# Patient Record
Sex: FEMALE | Race: WHITE | NOT HISPANIC OR LATINO | URBAN - METROPOLITAN AREA
[De-identification: names, ages, dates, MRNs, and addresses within clinical notes are randomized per-mention and may not be internally consistent; named-entity substitution may affect disease eponyms.]

---

## 2018-12-22 ENCOUNTER — EMERGENCY (EMERGENCY)
Facility: HOSPITAL | Age: 48
LOS: 1 days | Discharge: HOME | End: 2018-12-22
Attending: EMERGENCY MEDICINE | Admitting: EMERGENCY MEDICINE
Payer: COMMERCIAL

## 2018-12-22 VITALS
WEIGHT: 145.06 LBS | DIASTOLIC BLOOD PRESSURE: 83 MMHG | RESPIRATION RATE: 18 BRPM | TEMPERATURE: 98 F | HEIGHT: 64 IN | HEART RATE: 79 BPM | OXYGEN SATURATION: 100 % | SYSTOLIC BLOOD PRESSURE: 151 MMHG

## 2018-12-22 DIAGNOSIS — Z90.710 ACQUIRED ABSENCE OF BOTH CERVIX AND UTERUS: Chronic | ICD-10-CM

## 2018-12-22 LAB
ALBUMIN SERPL ELPH-MCNC: 3.7 G/DL — SIGNIFICANT CHANGE UP (ref 3.3–5)
ALP SERPL-CCNC: 82 U/L — SIGNIFICANT CHANGE UP (ref 40–120)
ALT FLD-CCNC: 35 U/L — SIGNIFICANT CHANGE UP (ref 12–78)
ANION GAP SERPL CALC-SCNC: 8 MMOL/L — SIGNIFICANT CHANGE UP (ref 5–17)
AST SERPL-CCNC: 40 U/L — HIGH (ref 15–37)
BILIRUB SERPL-MCNC: 0.2 MG/DL — SIGNIFICANT CHANGE UP (ref 0.2–1.2)
BUN SERPL-MCNC: 18 MG/DL — SIGNIFICANT CHANGE UP (ref 7–23)
CALCIUM SERPL-MCNC: 8.6 MG/DL — SIGNIFICANT CHANGE UP (ref 8.5–10.1)
CHLORIDE SERPL-SCNC: 104 MMOL/L — SIGNIFICANT CHANGE UP (ref 96–108)
CK SERPL-CCNC: 64 U/L — SIGNIFICANT CHANGE UP (ref 26–192)
CO2 SERPL-SCNC: 27 MMOL/L — SIGNIFICANT CHANGE UP (ref 22–31)
CREAT SERPL-MCNC: 0.81 MG/DL — SIGNIFICANT CHANGE UP (ref 0.5–1.3)
D DIMER BLD IA.RAPID-MCNC: 177 NG/ML DDU — SIGNIFICANT CHANGE UP
GLUCOSE SERPL-MCNC: 92 MG/DL — SIGNIFICANT CHANGE UP (ref 70–99)
HCG SERPL-ACNC: 2 MIU/ML — SIGNIFICANT CHANGE UP
HCT VFR BLD CALC: 38.9 % — SIGNIFICANT CHANGE UP (ref 34.5–45)
HGB BLD-MCNC: 13.5 G/DL — SIGNIFICANT CHANGE UP (ref 11.5–15.5)
MCHC RBC-ENTMCNC: 30.2 PG — SIGNIFICANT CHANGE UP (ref 27–34)
MCHC RBC-ENTMCNC: 34.7 GM/DL — SIGNIFICANT CHANGE UP (ref 32–36)
MCV RBC AUTO: 87 FL — SIGNIFICANT CHANGE UP (ref 80–100)
NRBC # BLD: 0 /100 WBCS — SIGNIFICANT CHANGE UP (ref 0–0)
PLATELET # BLD AUTO: 271 K/UL — SIGNIFICANT CHANGE UP (ref 150–400)
POTASSIUM SERPL-MCNC: 3.6 MMOL/L — SIGNIFICANT CHANGE UP (ref 3.5–5.3)
POTASSIUM SERPL-SCNC: 3.6 MMOL/L — SIGNIFICANT CHANGE UP (ref 3.5–5.3)
PROT SERPL-MCNC: 8.4 G/DL — HIGH (ref 6–8.3)
RBC # BLD: 4.47 M/UL — SIGNIFICANT CHANGE UP (ref 3.8–5.2)
RBC # FLD: 11.8 % — SIGNIFICANT CHANGE UP (ref 10.3–14.5)
SODIUM SERPL-SCNC: 139 MMOL/L — SIGNIFICANT CHANGE UP (ref 135–145)
TROPONIN I SERPL-MCNC: <.015 NG/ML — SIGNIFICANT CHANGE UP (ref 0.01–0.04)
WBC # BLD: 7.65 K/UL — SIGNIFICANT CHANGE UP (ref 3.8–10.5)
WBC # FLD AUTO: 7.65 K/UL — SIGNIFICANT CHANGE UP (ref 3.8–10.5)

## 2018-12-22 PROCEDURE — 99285 EMERGENCY DEPT VISIT HI MDM: CPT

## 2018-12-22 PROCEDURE — 71045 X-RAY EXAM CHEST 1 VIEW: CPT | Mod: 26

## 2018-12-22 RX ORDER — SODIUM CHLORIDE 9 MG/ML
1000 INJECTION INTRAMUSCULAR; INTRAVENOUS; SUBCUTANEOUS
Qty: 0 | Refills: 0 | Status: COMPLETED | OUTPATIENT
Start: 2018-12-22 | End: 2018-12-22

## 2018-12-22 RX ORDER — LISINOPRIL 2.5 MG/1
1 TABLET ORAL
Qty: 0 | Refills: 0 | COMMUNITY

## 2018-12-22 RX ADMIN — SODIUM CHLORIDE 1000 MILLILITER(S): 9 INJECTION INTRAMUSCULAR; INTRAVENOUS; SUBCUTANEOUS at 23:30

## 2018-12-22 RX ADMIN — SODIUM CHLORIDE 1000 MILLILITER(S): 9 INJECTION INTRAMUSCULAR; INTRAVENOUS; SUBCUTANEOUS at 22:30

## 2018-12-22 NOTE — ED PROVIDER NOTE - CARE PLAN
Principal Discharge DX:	Syncope and collapse Principal Discharge DX:	Syncope and collapse  Secondary Diagnosis:	UTI (urinary tract infection)

## 2018-12-22 NOTE — ED PROVIDER NOTE - OBJECTIVE STATEMENT
47 yo F p/w was at a bar tonight, and suddenly at rest developed chest pain - described as a tightness across lower chest. This was assoc with dizziness, light headed and diaphoresis. Pt then passed out, reported short loc. Pt believes she hit r side of head. Pt co mild malaise now. Occurred ~ 30 min pta. no further cp / dizziness. no recent events. Pt with nl po intake today. Pt was in nl state of health earlier except was diag with "mild UTI" this am. No cough/sputum. no fever/chills. no weak / malaise. no agg/allev factors. no other inj or co.

## 2018-12-22 NOTE — ED PROVIDER NOTE - CARE PROVIDER_API CALL
Bryson Boston), Internal Medicine  [address], [place] [id number]  Phone: [phone]  Fax: [phone]    Jan Shahid (), Internal Medicine  Internal Medicine  [address] [place] [address]  Phone: [phone]  Fax: [phone]

## 2018-12-22 NOTE — ED ADULT NURSE NOTE - NSIMPLEMENTINTERV_GEN_ALL_ED
Implemented All Fall with Harm Risk Interventions:  [place] to call system. Call bell, personal items and telephone within reach. Instruct patient to call for assistance. Room bathroom lighting operational. Non-slip footwear when patient is off stretcher. Physically safe environment: no spills, clutter or unnecessary equipment. Stretcher in lowest position, wheels locked, appropriate side rails in place. Provide visual cue, wrist band, yellow gown, etc. Monitor gait and stability. Monitor for mental status changes and reorient to person, place, and time. Review medications for side effects contributing to fall risk. Reinforce activity limits and safety measures with patient and family. Provide visual clues: red socks.

## 2018-12-22 NOTE — ED ADULT NURSE NOTE - OBJECTIVE STATEMENT
Pt. reported syncopal episode 1 hour prior to ED arrival. Pt. stated she was standing up and became diaphoretic with generalized abdominal discomfort. Pt. stated, " I woke up on the floor". Pts. spouse present at bedside during incident. Denied shortness of breath or chest pain.

## 2018-12-22 NOTE — ED PROVIDER NOTE - PROGRESS NOTE DETAILS
Pt doing welll, no acute co of changes. no events. Pt asymptomatic. Pt will await cardio in am. Pt doing well, no events. Pt awaiting cardio. anastacia (obey) seen eval pt, cleared for d/c and outpt f/u. Reevaluated patient at bedside.  Patient feeling well.  Discussed the results of all diagnostic testing in ED and copies of all reports given.   Patient relates aware of UTI, was dx yesterday, has RX for abx already. An opportunity to ask questions was given.  Discussed the importance of prompt, close medical follow-up.  Patient will return with any changes, concerns or persistent / worsening symptoms.  Understanding of all instructions verbalized.

## 2018-12-23 VITALS
TEMPERATURE: 98 F | OXYGEN SATURATION: 100 % | SYSTOLIC BLOOD PRESSURE: 149 MMHG | DIASTOLIC BLOOD PRESSURE: 64 MMHG | HEART RATE: 69 BPM | RESPIRATION RATE: 14 BRPM

## 2018-12-23 LAB
APPEARANCE UR: ABNORMAL
BACTERIA # UR AUTO: ABNORMAL
BILIRUB UR-MCNC: NEGATIVE — SIGNIFICANT CHANGE UP
CK SERPL-CCNC: 54 U/L — SIGNIFICANT CHANGE UP (ref 26–192)
COLOR SPEC: YELLOW — SIGNIFICANT CHANGE UP
DIFF PNL FLD: ABNORMAL
EPI CELLS # UR: ABNORMAL
GLUCOSE UR QL: NEGATIVE — SIGNIFICANT CHANGE UP
KETONES UR-MCNC: NEGATIVE — SIGNIFICANT CHANGE UP
LEUKOCYTE ESTERASE UR-ACNC: ABNORMAL
NITRITE UR-MCNC: NEGATIVE — SIGNIFICANT CHANGE UP
PH UR: 5 — SIGNIFICANT CHANGE UP (ref 5–8)
PROT UR-MCNC: NEGATIVE — SIGNIFICANT CHANGE UP
RBC CASTS # UR COMP ASSIST: SIGNIFICANT CHANGE UP /HPF (ref 0–4)
SP GR SPEC: 1.01 — SIGNIFICANT CHANGE UP (ref 1.01–1.02)
TROPONIN I SERPL-MCNC: <.015 NG/ML — SIGNIFICANT CHANGE UP (ref 0.01–0.04)
UROBILINOGEN FLD QL: NEGATIVE — SIGNIFICANT CHANGE UP
WBC UR QL: ABNORMAL

## 2018-12-23 PROCEDURE — 80053 COMPREHEN METABOLIC PANEL: CPT

## 2018-12-23 PROCEDURE — 99284 EMERGENCY DEPT VISIT MOD MDM: CPT | Mod: 25

## 2018-12-23 PROCEDURE — 87186 SC STD MICRODIL/AGAR DIL: CPT

## 2018-12-23 PROCEDURE — 36415 COLL VENOUS BLD VENIPUNCTURE: CPT

## 2018-12-23 PROCEDURE — 84484 ASSAY OF TROPONIN QUANT: CPT

## 2018-12-23 PROCEDURE — 96361 HYDRATE IV INFUSION ADD-ON: CPT

## 2018-12-23 PROCEDURE — 99284 EMERGENCY DEPT VISIT MOD MDM: CPT

## 2018-12-23 PROCEDURE — 70450 CT HEAD/BRAIN W/O DYE: CPT

## 2018-12-23 PROCEDURE — 84702 CHORIONIC GONADOTROPIN TEST: CPT

## 2018-12-23 PROCEDURE — 71045 X-RAY EXAM CHEST 1 VIEW: CPT

## 2018-12-23 PROCEDURE — 82550 ASSAY OF CK (CPK): CPT

## 2018-12-23 PROCEDURE — 96360 HYDRATION IV INFUSION INIT: CPT

## 2018-12-23 PROCEDURE — 87086 URINE CULTURE/COLONY COUNT: CPT

## 2018-12-23 PROCEDURE — 85379 FIBRIN DEGRADATION QUANT: CPT

## 2018-12-23 PROCEDURE — 85027 COMPLETE CBC AUTOMATED: CPT

## 2018-12-23 PROCEDURE — 81001 URINALYSIS AUTO W/SCOPE: CPT

## 2018-12-23 PROCEDURE — 93005 ELECTROCARDIOGRAM TRACING: CPT

## 2018-12-23 PROCEDURE — 70450 CT HEAD/BRAIN W/O DYE: CPT | Mod: 26

## 2018-12-23 RX ADMIN — SODIUM CHLORIDE 1000 MILLILITER(S): 9 INJECTION INTRAMUSCULAR; INTRAVENOUS; SUBCUTANEOUS at 00:10

## 2018-12-23 RX ADMIN — SODIUM CHLORIDE 1000 MILLILITER(S): 9 INJECTION INTRAMUSCULAR; INTRAVENOUS; SUBCUTANEOUS at 01:10

## 2018-12-23 NOTE — ED ADULT NURSE REASSESSMENT NOTE - NS ED NURSE REASSESS COMMENT FT1
report received and care assumed, pt resting in bed, NSR on monitor, awaiting cardiology at this time, no complaints, will continue to monitor

## 2018-12-23 NOTE — CONSULT NOTE ADULT - ASSESSMENT
Nargis appears well a clinical history most consistent with vasovagal syncope. She has no evidence for vascular disease or structural heart disease on examination. He had been some discussion concerning the etiology, natural history, and the management vasovagal syncope. She can be discharged home from a cardiac standpoint and conservative measures have been discussed to prevent this. She will undergo echocardiography and exercise stress testing as an outpatient to assess for any underlying structural heart disease she will call any change in symptoms and further management can be dependent on her clinical course

## 2018-12-23 NOTE — CONSULT NOTE ADULT - SUBJECTIVE AND OBJECTIVE BOX
[facility] Consultation    CHIEF COMPLAINT: Patient is a 48y old  Female who presents with a chief complaint of syncope    HPI:  Pt came to emergency room after an episode of syncope. She was in a bar last night and had one drink w/o any prior history. She had an episode of lightheadedness with diaphoresis and then had an episode of syncope possibly hitting the right side of her head. She came to the emergency room for evaluation and is now feeling better. She has no cardiac history and reports no chest pain, dyspnea, palpitations, lightheadedness, or prior syncope    PAST MEDICAL & SURGICAL HISTORY:  Hypertension  H/O abdominal hysterectomy      SOCIAL HISTORY: no tob    FAMILY HISTORY: no cardiac disease      MEDICATIONS  (STANDING): lisinopril 10 mg QD      Allergies    cephalosporins (Hives; Rash)  penicillins (Hives; Rash)    Intolerances        REVIEW OF SYSTEMS:    CONSTITUTIONAL: No weakness, fevers or chills  EYES: No visual changes, No diplopia  ENMT: No throat pain , No exudate  NECK: No pain or stiffness  RESPIRATORY: No cough, wheezing, hemoptysis; No shortness of breath  CARDIOVASCULAR: No chest pain or palpitations  GASTROINTESTINAL: No abdominal pain. No nausea, vomiting, or hematemesis; No diarrhea or constipation. No melena or hematochezia.  GENITOURINARY: No dysuria, frequency or hematuria  NEUROLOGICAL: No numbness or weakness  SKIN: No itching or rash  All other review of systems is negative unless indicated above    VITAL SIGNS:   Vital Signs Last 24 Hrs  T(C): 36.8 (23 Dec 2018 08:57), Max: 36.8 (23 Dec 2018 04:16)  T(F): 98.3 (23 Dec 2018 08:57), Max: 98.3 (23 Dec 2018 08:57)  HR: 69 (23 Dec 2018 08:57) (69 - 79)  BP: 149/64 (23 Dec 2018 08:57) (120/59 - 151/83)  BP(mean): --  RR: 14 (23 Dec 2018 08:57) (14 - 18)  SpO2: 100% (23 Dec 2018 08:57) (97% - 100%)    I&O's Summary      PHYSICAL EXAM:    Constitutional: NAD, awake and alert, well-developed  Eyes:  EOMI,  Pupils round, no lesions  ENMT: no exudate or erythema  Pulmonary: Non-labored, breath sounds are clear bilaterally, No wheezing, rales or rhonchi  Cardiovascular: PMI non-displaced Regular S1 and S2, no murmurs, rubs, gallops or clicks  Gastrointestinal: Bowel Sounds present, soft, nontender.   Lymph: No peripheral edema. No cervical lymphadenopathy.  Neurological: Alert, no focal deficits  Skin: No rashes. Changes of chronic venous stasis. No cyanosis.  Psych:  Mood & affect appropriate    LABS: All Labs Reviewed:                        13.5   7.65  )-----------( 271      ( 22 Dec 2018 22:38 )             38.9     22 Dec 2018 22:38    139    |  104    |  18     ----------------------------<  92     3.6     |  27     |  0.81     Ca    8.6        22 Dec 2018 22:38    TPro  8.4    /  Alb  3.7    /  TBili  0.2    /  DBili  x      /  AST  40     /  ALT  35     /  AlkPhos  82     22 Dec 2018 22:38      CARDIAC MARKERS ( 23 Dec 2018 06:38 )  <.015 ng/mL / x     / 54 U/L / x     / x      CARDIAC MARKERS ( 22 Dec 2018 22:38 )  <.015 ng/mL / x     / 64 U/L / x     / x        Electrocardiography reveals sinus rhythm with no abnormalities              RADIOLOGY/EKG: Alert and oriented to person, place, time/situation. normal mood and affect. no apparent risk to self or others.

## 2018-12-27 DIAGNOSIS — R55 SYNCOPE AND COLLAPSE: ICD-10-CM

## 2018-12-27 PROBLEM — I10 ESSENTIAL (PRIMARY) HYPERTENSION: Chronic | Status: ACTIVE | Noted: 2018-12-22

## 2018-12-27 PROBLEM — Z00.00 ENCOUNTER FOR PREVENTIVE HEALTH EXAMINATION: Status: ACTIVE | Noted: 2018-12-27

## 2019-01-02 ENCOUNTER — APPOINTMENT (OUTPATIENT)
Dept: CARDIOLOGY | Facility: CLINIC | Age: 49
End: 2019-01-02
Payer: COMMERCIAL

## 2019-01-02 PROCEDURE — 93306 TTE W/DOPPLER COMPLETE: CPT

## 2019-01-31 ENCOUNTER — APPOINTMENT (OUTPATIENT)
Dept: CARDIOLOGY | Facility: CLINIC | Age: 49
End: 2019-01-31
Payer: COMMERCIAL

## 2019-01-31 PROCEDURE — 93015 CV STRESS TEST SUPVJ I&R: CPT

## 2019-11-13 NOTE — ED PROVIDER NOTE - MUSCULOSKELETAL, MLM
"""Follow ERM w/o surgery. Call if vision decreases or distortion increases.  """ Spine appears normal, NO spinal tend (c,t,l). range of motion is not limited, no muscle or joint tenderness noted

## 2021-09-21 ENCOUNTER — TRANSCRIPTION ENCOUNTER (OUTPATIENT)
Age: 51
End: 2021-09-21

## 2021-09-29 ENCOUNTER — TRANSCRIPTION ENCOUNTER (OUTPATIENT)
Age: 51
End: 2021-09-29

## 2022-03-30 ENCOUNTER — TRANSCRIPTION ENCOUNTER (OUTPATIENT)
Age: 52
End: 2022-03-30

## 2023-01-29 ENCOUNTER — NON-APPOINTMENT (OUTPATIENT)
Age: 53
End: 2023-01-29

## 2023-02-01 ENCOUNTER — TRANSCRIPTION ENCOUNTER (OUTPATIENT)
Age: 53
End: 2023-02-01

## 2023-02-01 ENCOUNTER — EMERGENCY (EMERGENCY)
Facility: HOSPITAL | Age: 53
LOS: 1 days | Discharge: HOME | End: 2023-02-01
Attending: STUDENT IN AN ORGANIZED HEALTH CARE EDUCATION/TRAINING PROGRAM | Admitting: STUDENT IN AN ORGANIZED HEALTH CARE EDUCATION/TRAINING PROGRAM
Payer: COMMERCIAL

## 2023-02-01 VITALS
RESPIRATION RATE: 16 BRPM | SYSTOLIC BLOOD PRESSURE: 134 MMHG | OXYGEN SATURATION: 98 % | DIASTOLIC BLOOD PRESSURE: 86 MMHG | HEART RATE: 77 BPM

## 2023-02-01 VITALS
HEIGHT: 64 IN | OXYGEN SATURATION: 99 % | SYSTOLIC BLOOD PRESSURE: 121 MMHG | WEIGHT: 149.91 LBS | RESPIRATION RATE: 18 BRPM | HEART RATE: 97 BPM | DIASTOLIC BLOOD PRESSURE: 73 MMHG | TEMPERATURE: 98 F

## 2023-02-01 DIAGNOSIS — Z90.710 ACQUIRED ABSENCE OF BOTH CERVIX AND UTERUS: Chronic | ICD-10-CM

## 2023-02-01 LAB
ALBUMIN SERPL ELPH-MCNC: 3.7 G/DL — SIGNIFICANT CHANGE UP (ref 3.3–5)
ALP SERPL-CCNC: 88 U/L — SIGNIFICANT CHANGE UP (ref 40–120)
ALT FLD-CCNC: 131 U/L — HIGH (ref 12–78)
ANION GAP SERPL CALC-SCNC: 6 MMOL/L — SIGNIFICANT CHANGE UP (ref 5–17)
AST SERPL-CCNC: 108 U/L — HIGH (ref 15–37)
BASOPHILS # BLD AUTO: 0.01 K/UL — SIGNIFICANT CHANGE UP (ref 0–0.2)
BASOPHILS NFR BLD AUTO: 0.3 % — SIGNIFICANT CHANGE UP (ref 0–2)
BILIRUB SERPL-MCNC: 0.4 MG/DL — SIGNIFICANT CHANGE UP (ref 0.2–1.2)
BUN SERPL-MCNC: 6 MG/DL — LOW (ref 7–23)
CALCIUM SERPL-MCNC: 8.6 MG/DL — SIGNIFICANT CHANGE UP (ref 8.5–10.1)
CHLORIDE SERPL-SCNC: 105 MMOL/L — SIGNIFICANT CHANGE UP (ref 96–108)
CO2 SERPL-SCNC: 27 MMOL/L — SIGNIFICANT CHANGE UP (ref 22–31)
CREAT SERPL-MCNC: 0.75 MG/DL — SIGNIFICANT CHANGE UP (ref 0.5–1.3)
EGFR: 95 ML/MIN/1.73M2 — SIGNIFICANT CHANGE UP
EOSINOPHIL # BLD AUTO: 0 K/UL — SIGNIFICANT CHANGE UP (ref 0–0.5)
EOSINOPHIL NFR BLD AUTO: 0 % — SIGNIFICANT CHANGE UP (ref 0–6)
GLUCOSE SERPL-MCNC: 95 MG/DL — SIGNIFICANT CHANGE UP (ref 70–99)
HCT VFR BLD CALC: 40.8 % — SIGNIFICANT CHANGE UP (ref 34.5–45)
HGB BLD-MCNC: 14 G/DL — SIGNIFICANT CHANGE UP (ref 11.5–15.5)
IMM GRANULOCYTES NFR BLD AUTO: 0.3 % — SIGNIFICANT CHANGE UP (ref 0–0.9)
LYMPHOCYTES # BLD AUTO: 0.92 K/UL — LOW (ref 1–3.3)
LYMPHOCYTES # BLD AUTO: 23.8 % — SIGNIFICANT CHANGE UP (ref 13–44)
MCHC RBC-ENTMCNC: 29.8 PG — SIGNIFICANT CHANGE UP (ref 27–34)
MCHC RBC-ENTMCNC: 34.3 GM/DL — SIGNIFICANT CHANGE UP (ref 32–36)
MCV RBC AUTO: 86.8 FL — SIGNIFICANT CHANGE UP (ref 80–100)
MONOCYTES # BLD AUTO: 0.36 K/UL — SIGNIFICANT CHANGE UP (ref 0–0.9)
MONOCYTES NFR BLD AUTO: 9.3 % — SIGNIFICANT CHANGE UP (ref 2–14)
NEUTROPHILS # BLD AUTO: 2.57 K/UL — SIGNIFICANT CHANGE UP (ref 1.8–7.4)
NEUTROPHILS NFR BLD AUTO: 66.3 % — SIGNIFICANT CHANGE UP (ref 43–77)
NRBC # BLD: 0 /100 WBCS — SIGNIFICANT CHANGE UP (ref 0–0)
PLATELET # BLD AUTO: 193 K/UL — SIGNIFICANT CHANGE UP (ref 150–400)
POTASSIUM SERPL-MCNC: 3.8 MMOL/L — SIGNIFICANT CHANGE UP (ref 3.5–5.3)
POTASSIUM SERPL-SCNC: 3.8 MMOL/L — SIGNIFICANT CHANGE UP (ref 3.5–5.3)
PROT SERPL-MCNC: 8.3 G/DL — SIGNIFICANT CHANGE UP (ref 6–8.3)
RBC # BLD: 4.7 M/UL — SIGNIFICANT CHANGE UP (ref 3.8–5.2)
RBC # FLD: 11.8 % — SIGNIFICANT CHANGE UP (ref 10.3–14.5)
SODIUM SERPL-SCNC: 138 MMOL/L — SIGNIFICANT CHANGE UP (ref 135–145)
TROPONIN I, HIGH SENSITIVITY RESULT: 8.5 NG/L — SIGNIFICANT CHANGE UP
WBC # BLD: 3.87 K/UL — SIGNIFICANT CHANGE UP (ref 3.8–10.5)
WBC # FLD AUTO: 3.87 K/UL — SIGNIFICANT CHANGE UP (ref 3.8–10.5)

## 2023-02-01 PROCEDURE — 85025 COMPLETE CBC W/AUTO DIFF WBC: CPT

## 2023-02-01 PROCEDURE — 70450 CT HEAD/BRAIN W/O DYE: CPT | Mod: MA

## 2023-02-01 PROCEDURE — 99285 EMERGENCY DEPT VISIT HI MDM: CPT

## 2023-02-01 PROCEDURE — 99285 EMERGENCY DEPT VISIT HI MDM: CPT | Mod: 25

## 2023-02-01 PROCEDURE — 70450 CT HEAD/BRAIN W/O DYE: CPT | Mod: 26,MA

## 2023-02-01 PROCEDURE — 36415 COLL VENOUS BLD VENIPUNCTURE: CPT

## 2023-02-01 PROCEDURE — 80053 COMPREHEN METABOLIC PANEL: CPT

## 2023-02-01 PROCEDURE — 93005 ELECTROCARDIOGRAM TRACING: CPT

## 2023-02-01 PROCEDURE — 93010 ELECTROCARDIOGRAM REPORT: CPT

## 2023-02-01 PROCEDURE — 84484 ASSAY OF TROPONIN QUANT: CPT

## 2023-02-01 RX ORDER — SODIUM CHLORIDE 9 MG/ML
1000 INJECTION INTRAMUSCULAR; INTRAVENOUS; SUBCUTANEOUS ONCE
Refills: 0 | Status: COMPLETED | OUTPATIENT
Start: 2023-02-01 | End: 2023-02-01

## 2023-02-01 RX ADMIN — SODIUM CHLORIDE 1000 MILLILITER(S): 9 INJECTION INTRAMUSCULAR; INTRAVENOUS; SUBCUTANEOUS at 12:09

## 2023-02-01 NOTE — ED ADULT NURSE NOTE - OBJECTIVE STATEMENT
52yr old female a&ox4 sitting up in stretcher from home, pt notes have had an unwitnessed fall, pt notes positive loc denies thinners, pt notes being able to get self up after fall, discomfort noted to right side of head. Pt notes intermittent episodes of dizziness and nausea. Pt able to follow simple instructions, b/l equal strengths in upper and lower extremities, speech noted to be clear, pt denies sensitivity to light. Pt denies lightheaded or dizziness, chest pain or sob at this time.

## 2023-02-01 NOTE — ED ADULT TRIAGE NOTE - PAIN: PRESENCE, MLM
denies pain/discomfort (Rating = 0) Paramedian Forehead Flap Division And Inset Text: Division and inset of the paramedian forehead flap was performed to achieve optimal aesthetic result, restore normal anatomic appearance and avoid distortion of normal anatomy, expedite and facilitate wound healing, achieve optimal functional result and because linear closure either not possible or would produce suboptimal result. The patient was prepped and draped in the usual manner. The pedicle was infiltrated with local anesthesia. The pedicle was sectioned with a #15 blade. The pedicle was de-bulked and trimmed to match the shape of the defect. Hemostasis was achieved. The flap donor site and free margin of the flap were secured with deep buried sutures and the wound edges were re-approximated.

## 2023-02-01 NOTE — ED PROVIDER NOTE - CLINICAL SUMMARY MEDICAL DECISION MAKING FREE TEXT BOX
52-year-old female with past medical history of hypertension complaining of dizziness described as lightheadedness for the last few days.  Patient states she started having flulike symptoms on January 27 tested positive for COVID the next day.  Patient states on Monday she was in the bathroom sitting untroubled started to experience lightheadedness and passed out hitting right side of head.  Patient states next day she went to urgent care evaluated had EKG advised to rest.  Patient states she continues to experience lightheadedness.  Patient admits has not been eating or drinking much.  Patient has any chest pain shortness of breath abdominal pain nausea vomiting or diarrhea.  Patient states she does not have fevers. pt well appearing, neuro intact, gait normal, likely post concussive or lightheadedness in setting of covid, will check basic labs, Ct head reassess

## 2023-02-01 NOTE — ED PROVIDER NOTE - PATIENT PORTAL LINK FT
You can access the FollowMyHealth Patient Portal offered by [facility] by registering at the following website: [email or web address]. By joining [facility]’s FollowMyHealth portal, you will also be able to view your health information using other applications (apps) compatible with our system.

## 2023-02-01 NOTE — ED PROVIDER NOTE - OBJECTIVE STATEMENT
Patient is a 52-year-old female with past medical history of hypertension coming planing of dizziness described as lightheadedness for the last few days.  Patient states she started having flulike symptoms on January 27 tested positive for COVID the next day.  Patient states on Monday she was in the bathroom sitting untroubled started to experience lightheadedness and passed out hitting right side of head.  Patient states next day she went to urgent care evaluated had EKG advised to rest.  Patient states she continues to experience lightheadedness.  Patient admits has not been eating or drinking much.  Patient has any chest pain shortness of breath abdominal pain nausea vomiting or diarrhea.  Patient states she does not have fevers.

## 2023-02-01 NOTE — ED PROVIDER NOTE - NSFOLLOWUPINSTRUCTIONS_ED_ALL_ED_FT
Follow up with pcp  return to er for any worsening symptoms              Concussion, Adult    A series of images showing how the quick head movements of a concussion injure the brain.   A concussion is a brain injury from a hard, direct hit (trauma) to the head or body. This direct hit causes the brain to shake quickly back and forth inside the skull. This can damage brain cells and cause chemical changes in the brain. A concussion may also be known as a mild traumatic brain injury (TBI).    Concussions are usually not life-threatening, but the effects of a concussion can be serious. If you have a concussion, you should be very careful to avoid having a second concussion.      What are the causes?    This condition is caused by:•A direct hit to your head, such as:  •Running into another player during a game.      •Being hit in a fight.      •Hitting your head on a hard surface.        •Sudden movement of your body that causes your brain to move back and forth inside the skull, such as in a car crash.        What are the signs or symptoms?    The signs of a concussion can be hard to notice. Early on, they may be missed by you, family members, and health care providers. You may look fine on the outside but may act or feel differently.    Every head injury is different. Symptoms are usually temporary but may last for days, weeks, or even months. Some symptoms appear right away, but other symptoms may not show up for hours or days. If your symptoms last longer than normal, you may have post-concussion syndrome.    Physical symptoms     •Headaches.      •Dizziness and problems with coordination or balance.      •Sensitivity to light or noise.      •Nausea or vomiting.      •Tiredness (fatigue).      •Vision or hearing problems.      •Changes in eating or sleeping patterns.      •Seizure.      Mental and emotional symptoms     •Irritability or mood changes.      •Memory problems.      •Trouble concentrating, organizing, or making decisions.      •Slowness in thinking, acting or reacting, speaking, or reading.      •Anxiety or depression.        How is this diagnosed?    This condition is diagnosed based on:  •Your symptoms.      •A description of your injury.      You may also have tests, including:  •Imaging tests, such as a CT scan or an MRI.      •Neuropsychological tests. These measure your thinking, understanding, learning, and remembering abilities.        How is this treated?    Treatment for this condition includes:•Stopping sports or activity if you are injured. If you hit your head or show signs of concussion:   •Do not return to sports or activities the same day.       •Get checked by a health care provider before you return to your activities.        •Physical and mental rest and careful observation, usually at home. Gradually return to your normal activities.    •Medicines to help with symptoms such as headaches, nausea, or difficulty sleeping.  •Avoid taking opioid pain medicine while recovering from a concussion.        •Avoiding alcohol and drugs. These may slow your recovery and can put you at risk of further injury.      •Referral to a concussion clinic or rehabilitation center.      Recovery from a concussion can take time. How fast you recover depends on many factors. Return to activities only when:  •Your symptoms are completely gone.      •Your health care provider says that it is safe.        Follow these instructions at home:    Activity   •Limit activities that require a lot of thought or concentration, such as:  •Doing homework or job-related work.      •Watching TV.      •Working on the computer or phone.      •Playing memory games and puzzles.      •Rest. Rest helps your brain heal. Make sure you:  •Get plenty of sleep. Most adults should get 7–9 hours of sleep each night.      •Rest during the day. Take naps or rest breaks when you feel tired.        •Avoid physical activity like exercise until your health care provider says it is safe. Stop any activity that worsens symptoms.      • Do not do high-risk activities that could cause a second concussion, such as riding a bike or playing sports.      •Ask your health care provider when you can return to your normal activities, such as school, work, athletics, and driving. Your ability to react may be slower after a brain injury. Never do these activities if you are dizzy. Your health care provider will likely give you a plan for gradually returning to activities.        General instructions   A bottle of beer, a glass of wine, and a glass of hard liquor with a "do not drink" sign over them.    •Take over-the-counter and prescription medicines only as told by your health care provider. Some medicines, such as blood thinners (anticoagulants) and aspirin, may increase the risk for complications, such as bleeding.      • Do not drink alcohol until your health care provider says you can.      •Watch your symptoms and tell others around you to do the same. Complications sometimes occur after a concussion. Older adults with a brain injury may have a higher risk of serious complications.      •Tell your , teachers, school nurse, school counselor, , or  about your injury, symptoms, and restrictions.      •Keep all follow-up visits as told by your health care provider. This is important.        How is this prevented?    Avoiding another brain injury is very important. In rare cases, another injury can lead to permanent brain damage, brain swelling, or death. The risk of this is greatest during the first 7–10 days after a head injury. Avoid injuries by:  •Stopping activities that could lead to a second concussion, such as contact or recreational sports, until your health care provider says it is okay.    •Taking these actions once you have returned to sports or activities:  •Avoiding plays or moves that can cause you to crash into another person. This is how most concussions occur.      •Following the rules and being respectful of other players. Do not engage in violent or illegal plays.        •Getting regular exercise that includes strength and balance training.      •Wearing a properly fitting helmet during sports, biking, or other activities. Helmets can help protect you from serious skull and brain injuries, but they may not protect you from a concussion. Even when wearing a helmet, you should avoid being hit in the head.        Contact a health care provider if:    •Your symptoms do not improve.      •You have new symptoms.      •You have another injury.        Get help right away if:  •You have new or worsening physical symptoms, such as:  •A severe or worsening headache.      •Weakness or numbness in any part of your body, slurred speech, vision changes, or confusion.      •Your coordination gets worse.      •Vomiting repeatedly.      •You have a seizure.      •You have unusual behavior changes.      •You lose consciousness, are sleepier than normal, or are difficult to wake up.        These symptoms may represent a serious problem that is an emergency. Do not wait to see if the symptoms will go away. Get medical help right away. Call your local emergency services (911 in the [place] Do not drive yourself to the hospital.       Summary    •A concussion is a brain injury that results from a hard, direct hit (trauma) to your head or body.      •You may have imaging tests and neuropsychological tests to diagnose a concussion.      •Treatment for this condition includes physical and mental rest and careful observation.      •Ask your health care provider when you can return to your normal activities, such as school, work, athletics, and driving.       •Get help right away if you have a severe headache, weakness in any part of the body, seizures, behavior changes, changes in vision, or if you are confused or sleepier than normal.      This information is not intended to replace advice given to you by your health care provider. Make sure you discuss any questions you have with your health care provider.      Document Revised: 03/03/2022 Document Reviewed: 03/03/2022    Elsevier Patient Education © 2022 [employer]

## 2023-02-01 NOTE — ED PROVIDER NOTE - CONSTITUTIONAL, MLM
Well appearing, awake, alert, oriented to person, place, time/situation and in no apparent distress. right temple swelling mild ttp normal...

## 2023-02-01 NOTE — ED ADULT NURSE NOTE - CHIEF COMPLAINT QUOTE
Syncope on Monday morning was seen at urgent care center and given clean bill of health. Pt Covid + since Saturday. Symptomatic since Friday. " I want to be evaluated because I am dizzy and I need these bumps on my head checked out"

## 2023-04-27 ENCOUNTER — APPOINTMENT (OUTPATIENT)
Dept: UROLOGY | Facility: CLINIC | Age: 53
End: 2023-04-27
Payer: COMMERCIAL

## 2023-04-27 VITALS
DIASTOLIC BLOOD PRESSURE: 81 MMHG | OXYGEN SATURATION: 99 % | WEIGHT: 156 LBS | SYSTOLIC BLOOD PRESSURE: 132 MMHG | HEIGHT: 64 IN | BODY MASS INDEX: 26.63 KG/M2 | TEMPERATURE: 97.5 F | RESPIRATION RATE: 14 BRPM | HEART RATE: 85 BPM

## 2023-04-27 DIAGNOSIS — R82.998 OTHER ABNORMAL FINDINGS IN URINE: ICD-10-CM

## 2023-04-27 PROCEDURE — 99204 OFFICE O/P NEW MOD 45 MIN: CPT

## 2023-04-27 NOTE — HISTORY OF PRESENT ILLNESS
[FreeTextEntry1] : Ms. DAMICO is a 53 year  White  F who comes today to clinic  referred by PCP office for leukocyturia 30 WBCs/HPF. No history of kidney stones. Used to have recurrent UTIs, however 2 years ago was her last one.\par Denies LUTS, dysuria, fevers, chills, hematuria, flank pain.\par No history of chemotherapy or contact with aromatic chemicals.\par Has never smoked.\par No family history of Prostate cancer or [place] related cancers.\par \par

## 2023-04-27 NOTE — ASSESSMENT
[FreeTextEntry1] : Ms. DAMICO is a 53 year  White  F who comes today to clinic  referred by PCP office for leukocyturia 30 WBCs/HPF.\par \par We discussed that Leukocyturia is defined by a UA microscopy with = 6 or more WBC/hpf on one appropriate collected specimen.\par \par Patients with a clearly benign cause such as infection, menstruation, vigorous exercise, medical renal disease, recent procedures or trauma may not need a full workup if this is thought to be the cause. The presence of dysmorphic RBC, proteinuria, cellular casts or renal insufficiency calls for a concomitant nephrologic workup. \par \par

## 2023-05-02 ENCOUNTER — NON-APPOINTMENT (OUTPATIENT)
Age: 53
End: 2023-05-02

## 2023-05-02 LAB
APPEARANCE: CLEAR
BACTERIA UR CULT: NORMAL
BACTERIA: NEGATIVE /HPF
BILIRUBIN URINE: NEGATIVE
BLOOD URINE: NEGATIVE
CAST: 0 /LPF
COLOR: YELLOW
EPITHELIAL CELLS: 0 /HPF
GLUCOSE QUALITATIVE U: NEGATIVE MG/DL
KETONES URINE: NEGATIVE MG/DL
LEUKOCYTE ESTERASE URINE: NEGATIVE
MICROSCOPIC-UA: NORMAL
NITRITE URINE: NEGATIVE
PH URINE: 6.5
PROTEIN URINE: NEGATIVE MG/DL
RED BLOOD CELLS URINE: 0 /HPF
SPECIFIC GRAVITY URINE: 1
URINE CYTOLOGY: NORMAL
UROBILINOGEN URINE: 0.2 MG/DL
WHITE BLOOD CELLS URINE: 0 /HPF

## 2023-05-17 NOTE — ED ADULT NURSE NOTE - NSFALLRSKASSESSTYPE_ED_ALL_ED
Render Risk Assessment In Note?: no Detail Level: Simple Additional Notes: Patient's consent was obtained to proceed with the visit and recommended plan of care after discussion of all risks and benefits, including the risks of COVID-19 exposure. Initial (On Arrival)

## 2023-06-08 ENCOUNTER — NON-APPOINTMENT (OUTPATIENT)
Age: 53
End: 2023-06-08

## 2023-09-08 NOTE — ED ADULT TRIAGE NOTE - CHIEF COMPLAINT QUOTE
This is a 69 yo male with PMH HTN, HLD, DM II, pAF, EF 72%, who presents for AF ablation. Pt currently denies active chest pain, SOB, WATERS, orthopnea, PND, LE edema, dizziness, or syncope.  Syncope on Monday morning was seen at urgent care center and given clean bill of health. Pt Covid + since Saturday. Symptomatic since Friday. " I want to be evaluated because I am dizzy and I need these bumps on my head checked out"

## 2024-10-20 ENCOUNTER — NON-APPOINTMENT (OUTPATIENT)
Age: 54
End: 2024-10-20